# Patient Record
Sex: MALE | URBAN - NONMETROPOLITAN AREA
[De-identification: names, ages, dates, MRNs, and addresses within clinical notes are randomized per-mention and may not be internally consistent; named-entity substitution may affect disease eponyms.]

---

## 2019-08-26 ENCOUNTER — IMPORTED ENCOUNTER (OUTPATIENT)
Dept: URBAN - NONMETROPOLITAN AREA CLINIC 1 | Facility: CLINIC | Age: 64
End: 2019-08-26

## 2019-08-26 PROBLEM — H52.4: Noted: 2019-08-26

## 2019-08-26 PROBLEM — H43.813: Noted: 2019-08-26

## 2019-08-26 PROBLEM — H25.13: Noted: 2019-08-26

## 2019-08-26 PROBLEM — H52.03: Noted: 2019-08-26

## 2019-08-26 PROCEDURE — S0620 ROUTINE OPHTHALMOLOGICAL EXA: HCPCS

## 2019-08-26 NOTE — PATIENT DISCUSSION
Simple Hyperopia OU w/Presbyopia-  discussed findings w/patient-  new spectacle Rx issued-  RTC 1 year or prnCataracts OU-  discussed findings w/patient-  no treatment indicated at this time-  UV protection recommended-  monitor yearly or prn PVD OU-  Discussed findings of exam in detail with the patient. -  The risk of retinal detachment in patients with PVDs was discussed with the patient and the warning signs of retinal detachment were carefully reviewed with the patient. -  The patient was warned to return to the office or contact the ophthalmologist on call immediately if they experience signs of retinal detachment or changes in vision from today-  monitor yearly or prn; 's Notes: MR 8/26/[id number] 8/26/2019

## 2020-11-05 ENCOUNTER — IMPORTED ENCOUNTER (OUTPATIENT)
Dept: URBAN - NONMETROPOLITAN AREA CLINIC 1 | Facility: CLINIC | Age: 65
End: 2020-11-05

## 2020-11-05 PROBLEM — H52.4: Noted: 2020-11-05

## 2020-11-05 PROBLEM — H43.811: Noted: 2020-11-05

## 2020-11-05 PROBLEM — H25.13: Noted: 2020-11-05

## 2020-11-05 PROBLEM — H52.03: Noted: 2020-11-05

## 2020-11-05 PROCEDURE — 92014 COMPRE OPH EXAM EST PT 1/>: CPT

## 2020-11-05 PROCEDURE — 92015 DETERMINE REFRACTIVE STATE: CPT

## 2020-11-05 NOTE — PATIENT DISCUSSION
Simple Hyperopia OU w/Presbyopia-  discussed findings w/patient-  new spectacle Rx issued-  RTC 1 year or prnCataracts OU-  discussed findings w/patient-  no treatment indicated at this time-  UV protection recommended-  BAT done today: 20/30 OD and 20/50 OS-  monitor yearly or prn Floater OD-  Discussed findings of exam in detail with the patient. -  The risk of retinal detachment in patients with PVDs was discussed with the patient and the warning signs of retinal detachment were carefully reviewed with the patient. -  The patient was warned to return to the office or contact the ophthalmologist on call immediately if they experience signs of retinal detachment or changes in vision from today-  monitor yearly or prn

## 2021-02-22 ENCOUNTER — IMPORTED ENCOUNTER (OUTPATIENT)
Dept: URBAN - NONMETROPOLITAN AREA CLINIC 1 | Facility: CLINIC | Age: 66
End: 2021-02-22

## 2021-02-22 PROBLEM — H25.13: Noted: 2021-02-22

## 2021-02-22 PROBLEM — T15.02XA: Noted: 2021-02-22

## 2021-02-22 PROBLEM — H52.03: Noted: 2021-02-22

## 2021-02-22 PROBLEM — H43.811: Noted: 2021-02-22

## 2021-02-22 PROBLEM — H52.4: Noted: 2021-02-22

## 2021-02-22 PROCEDURE — 92012 INTRM OPH EXAM EST PATIENT: CPT

## 2021-02-22 PROCEDURE — 92071 CONTACT LENS FITTING FOR TX: CPT

## 2021-02-22 NOTE — PATIENT DISCUSSION
Corneal FB OS-  discussed findings w/patient-  removed metal and most of rust ring at  today-  placed Purevision 2 BCL-  start Antibiotic drops Rx'ed by Urgent Care-  RTC Wed f/u and possible more rust ring removal-  patient will be given my cell number -  2 day f/u corneal FB OS

## 2021-02-24 ENCOUNTER — IMPORTED ENCOUNTER (OUTPATIENT)
Dept: URBAN - NONMETROPOLITAN AREA CLINIC 1 | Facility: CLINIC | Age: 66
End: 2021-02-24

## 2021-02-24 PROBLEM — H43.811: Noted: 2021-02-22

## 2021-02-24 PROBLEM — H25.13: Noted: 2021-02-22

## 2021-02-24 PROBLEM — H52.03: Noted: 2021-02-22

## 2021-02-24 PROBLEM — H52.4: Noted: 2021-02-22

## 2021-02-24 PROBLEM — T15.02XD: Noted: 2021-02-24

## 2021-02-24 PROCEDURE — 99213 OFFICE O/P EST LOW 20 MIN: CPT

## 2021-02-24 NOTE — PATIENT DISCUSSION
Corneal FB OS-  discussed findings w/patient-  continue Polytrim QID OS x 1 week-  start Lotemax SM BID OS x 1 week-  start Refresh Relieva PRN OS-  RTC as scheduled or prn

## 2021-03-05 ENCOUNTER — IMPORTED ENCOUNTER (OUTPATIENT)
Dept: URBAN - NONMETROPOLITAN AREA CLINIC 1 | Facility: CLINIC | Age: 66
End: 2021-03-05

## 2021-03-05 PROBLEM — H43.811: Noted: 2021-03-05

## 2021-03-05 PROBLEM — H25.13: Noted: 2021-03-05

## 2021-03-05 PROBLEM — T15.02XS: Noted: 2021-03-05

## 2021-03-05 PROBLEM — T15.11XA: Noted: 2021-03-05

## 2021-03-05 PROBLEM — H52.4: Noted: 2021-03-05

## 2021-03-05 PROBLEM — H52.03: Noted: 2021-03-05

## 2021-03-05 PROCEDURE — 92012 INTRM OPH EXAM EST PATIENT: CPT

## 2021-03-05 NOTE — PATIENT DISCUSSION
Conjunctival FB OD-  discussed findings w/patient-  start Maxitrol QID OU x 7 days then d/c-  RTC as scheduled or prn

## 2021-03-26 ENCOUNTER — IMPORTED ENCOUNTER (OUTPATIENT)
Dept: URBAN - NONMETROPOLITAN AREA CLINIC 1 | Facility: CLINIC | Age: 66
End: 2021-03-26

## 2021-03-26 PROBLEM — H52.4: Noted: 2021-03-26

## 2021-03-26 PROBLEM — H52.03: Noted: 2021-03-26

## 2021-03-26 PROBLEM — H43.811: Noted: 2021-03-26

## 2021-03-26 PROBLEM — H25.13: Noted: 2021-11-29

## 2021-03-26 PROBLEM — H25.13: Noted: 2021-03-26

## 2021-03-26 PROBLEM — H16.8: Noted: 2021-11-29

## 2021-03-26 PROBLEM — INACTIVE: Noted: 2021-11-29

## 2021-03-26 PROBLEM — H16.8: Noted: 2021-03-26

## 2021-03-26 PROCEDURE — 99213 OFFICE O/P EST LOW 20 MIN: CPT

## 2021-03-26 NOTE — PATIENT DISCUSSION
Keratitis OD-  discussed findings w/patient-  no FB found today-  start Besivance BID OD x 5 days-  RTC as scheduled or prn

## 2021-11-29 ENCOUNTER — IMPORTED ENCOUNTER (OUTPATIENT)
Dept: URBAN - NONMETROPOLITAN AREA CLINIC 1 | Facility: CLINIC | Age: 66
End: 2021-11-29

## 2021-11-29 PROCEDURE — 92014 COMPRE OPH EXAM EST PT 1/>: CPT

## 2021-11-29 NOTE — PATIENT DISCUSSION
Cataract OU-Not yet surgical. -Reviewed symptoms of advancing cataract growth such as glare and halos and decreased vision.-Continue to monitor for now. Pt will notify us if any new symptoms develop.  No MRX given  1 yr dilation

## 2022-04-09 ASSESSMENT — VISUAL ACUITY
OU_SC: 20/70
OD_CC: 20/20
OS_CC: 20/30-
OD_CC: 20/25
OS_CC: 20/25
OU_CC: 20/20-1
OD_CC: 20/20
OU_CC: 20/20
OD_CC: 20/25
OS_GLARE: 20/50+2
OS_CC: 20/20
OD_SC: 20/70
OD_CC: 20/25
OS_CC: 20/25
OS_CC: 20/25
OU_CC: 20/25+2
OS_CC: 20/25
OS_SC: 20/70
OU_SC: 20/20
OD_CC: 20/30
OD_CC: 20/20
OS_CC: 20/30
OD_GLARE: 20/30

## 2022-04-09 ASSESSMENT — TONOMETRY
OD_IOP_MMHG: 16
OD_IOP_MMHG: 15
OD_IOP_MMHG: 16
OD_IOP_MMHG: 15
OS_IOP_MMHG: 14
OS_IOP_MMHG: 16
OS_IOP_MMHG: 16
OS_IOP_MMHG: 14
OD_IOP_MMHG: 16

## 2022-11-28 ENCOUNTER — COMPREHENSIVE EXAM (OUTPATIENT)
Dept: RURAL CLINIC 1 | Facility: CLINIC | Age: 67
End: 2022-11-28

## 2022-11-28 DIAGNOSIS — H25.13: ICD-10-CM

## 2022-11-28 DIAGNOSIS — H43.811: ICD-10-CM

## 2022-11-28 DIAGNOSIS — H52.03: ICD-10-CM

## 2022-11-28 DIAGNOSIS — H52.4: ICD-10-CM

## 2022-11-28 PROCEDURE — 92014 COMPRE OPH EXAM EST PT 1/>: CPT

## 2022-11-28 PROCEDURE — 92015 DETERMINE REFRACTIVE STATE: CPT

## 2022-11-28 ASSESSMENT — VISUAL ACUITY
OU_SC: 20/20-2
OD_SC: 20/25-1
OS_SC: 20/25-1
OU_CC: 20/25
OS_CC: 20/25
OD_CC: 20/25

## 2022-11-28 ASSESSMENT — TONOMETRY
OD_IOP_MMHG: 16
OS_IOP_MMHG: 16